# Patient Record
Sex: FEMALE | Race: WHITE | NOT HISPANIC OR LATINO | URBAN - NONMETROPOLITAN AREA
[De-identification: names, ages, dates, MRNs, and addresses within clinical notes are randomized per-mention and may not be internally consistent; named-entity substitution may affect disease eponyms.]

---

## 2017-04-20 ENCOUNTER — APPOINTMENT (OUTPATIENT)
Age: 57
Setting detail: DERMATOLOGY
End: 2017-04-20

## 2017-04-20 DIAGNOSIS — L30.9 DERMATITIS, UNSPECIFIED: ICD-10-CM

## 2017-04-20 DIAGNOSIS — B07.8 OTHER VIRAL WARTS: ICD-10-CM

## 2017-04-20 PROBLEM — E78.5 HYPERLIPIDEMIA, UNSPECIFIED: Status: ACTIVE | Noted: 2017-04-20

## 2017-04-20 PROCEDURE — 17110 DESTRUCT B9 LESION 1-14: CPT

## 2017-04-20 PROCEDURE — 99212 OFFICE O/P EST SF 10 MIN: CPT | Mod: 25

## 2017-04-20 PROCEDURE — OTHER PRESCRIPTION: OTHER

## 2017-04-20 PROCEDURE — OTHER PATIENT SPECIFIC COUNSELING: OTHER

## 2017-04-20 PROCEDURE — OTHER COUNSELING: OTHER

## 2017-04-20 PROCEDURE — OTHER BENIGN DESTRUCTION: OTHER

## 2017-04-20 PROCEDURE — OTHER TREATMENT REGIMEN: OTHER

## 2017-04-20 RX ORDER — FLUOCINOLONE ACETONIDE 0.025 %
CREAM (GRAM) TOPICAL
Qty: 1 | Refills: 1 | Status: ERX | COMMUNITY
Start: 2017-04-20

## 2017-04-20 ASSESSMENT — LOCATION DETAILED DESCRIPTION DERM
LOCATION DETAILED: RIGHT POSTERIOR NECK
LOCATION DETAILED: RIGHT CENTRAL MANDIBULAR CHEEK

## 2017-04-20 ASSESSMENT — LOCATION SIMPLE DESCRIPTION DERM
LOCATION SIMPLE: RIGHT CHEEK
LOCATION SIMPLE: POSTERIOR NECK

## 2017-04-20 ASSESSMENT — LOCATION ZONE DERM
LOCATION ZONE: FACE
LOCATION ZONE: NECK

## 2017-04-20 NOTE — PROCEDURE: BENIGN DESTRUCTION
Include Z78.9 (Other Specified Conditions Influencing Health Status) As An Associated Diagnosis?: Yes
Detail Level: Detailed
Post-Care Instructions: I reviewed with the patient in detail post-care instructions. Patient is to wear sunprotection, and avoid picking at any of the treated lesions. Pt may apply Vaseline to crusted or scabbing areas.
Consent: The patient's consent was obtained including but not limited to risks of crusting, scabbing, blistering, scarring, darker or lighter pigmentary change, recurrence, incomplete removal and infection.
Treatment Number (Will Not Render If 0): 0
Anesthesia Volume In Cc: 1
Add 52 Modifier (Optional): no
Medical Necessity Information: It is in your best interest to select a reason for this procedure from the list below. All of these items fulfill various CMS LCD requirements except the new and changing color options.
Medical Necessity Clause: This procedure was medically necessary because the lesions that were treated were:

## 2017-04-20 NOTE — PROCEDURE: TREATMENT REGIMEN
Initiate Treatment: Synalar cream twice daily to affected areas. Do not use on face, armpits or groin
Detail Level: Simple
Plan: Be alert to anything that may be setting it off. If rash fails to clear with the steroid cream or becomes more bothersome, consider biopsy or patch testing

## 2017-04-20 NOTE — PROCEDURE: PATIENT SPECIFIC COUNSELING
Hemostasis with aluminum chloride instead of electric cautery due to potential for scarring. Warned possible bleeding. Avoid lifting over 10 pounds for the next couple of days. Avoid shaving over warts if she develops new ones.
Detail Level: Detailed